# Patient Record
Sex: MALE | Race: OTHER
[De-identification: names, ages, dates, MRNs, and addresses within clinical notes are randomized per-mention and may not be internally consistent; named-entity substitution may affect disease eponyms.]

---

## 2018-09-03 ENCOUNTER — HOSPITAL ENCOUNTER (EMERGENCY)
Dept: HOSPITAL 54 - ER | Age: 38
Discharge: HOME | End: 2018-09-03
Payer: SELF-PAY

## 2018-09-03 VITALS — SYSTOLIC BLOOD PRESSURE: 145 MMHG | DIASTOLIC BLOOD PRESSURE: 96 MMHG

## 2018-09-03 VITALS — WEIGHT: 200 LBS | BODY MASS INDEX: 30.31 KG/M2 | HEIGHT: 68 IN

## 2018-09-03 DIAGNOSIS — R00.2: Primary | ICD-10-CM

## 2018-09-03 DIAGNOSIS — R07.89: ICD-10-CM

## 2018-09-03 DIAGNOSIS — F41.9: ICD-10-CM

## 2018-09-03 LAB
APTT PPP: 24 SEC (ref 23–34)
BASOPHILS # BLD AUTO: 0.2 /CMM (ref 0–0.2)
BASOPHILS NFR BLD AUTO: 2.8 % (ref 0–2)
BUN SERPL-MCNC: 13 MG/DL (ref 7–18)
CALCIUM SERPL-MCNC: 8.8 MG/DL (ref 8.5–10.1)
CHLORIDE SERPL-SCNC: 102 MMOL/L (ref 98–107)
CO2 SERPL-SCNC: 28 MMOL/L (ref 21–32)
CREAT SERPL-MCNC: 1.2 MG/DL (ref 0.6–1.3)
EOSINOPHIL NFR BLD AUTO: 0.3 % (ref 0–6)
GLUCOSE SERPL-MCNC: 101 MG/DL (ref 74–106)
HCT VFR BLD AUTO: 47 % (ref 39–51)
HGB BLD-MCNC: 15.2 G/DL (ref 13.5–17.5)
INR PPP: 0.95 (ref 0.85–1.15)
LYMPHOCYTES NFR BLD AUTO: 1.4 /CMM (ref 0.8–4.8)
LYMPHOCYTES NFR BLD AUTO: 22.8 % (ref 20–44)
MCH RBC QN AUTO: 26 PG (ref 26–33)
MCHC RBC AUTO-ENTMCNC: 32 G/DL (ref 31–36)
MCV RBC AUTO: 82 FL (ref 80–96)
MONOCYTES NFR BLD AUTO: 0.4 /CMM (ref 0.1–1.3)
MONOCYTES NFR BLD AUTO: 6.3 % (ref 2–12)
NEUTROPHILS # BLD AUTO: 4.1 /CMM (ref 1.8–8.9)
NEUTROPHILS NFR BLD AUTO: 67.8 % (ref 43–81)
PLATELET # BLD AUTO: 209 /CMM (ref 150–450)
POTASSIUM SERPL-SCNC: 4.1 MMOL/L (ref 3.5–5.1)
RBC # BLD AUTO: 5.77 MIL/UL (ref 4.5–6)
RDW COEFFICIENT OF VARIATION: 14 (ref 11.5–15)
SODIUM SERPL-SCNC: 138 MMOL/L (ref 136–145)
TROPONIN I SERPL-MCNC: < 0.017 NG/ML (ref 0–0.06)
WBC NRBC COR # BLD AUTO: 6.1 K/UL (ref 4.3–11)

## 2018-09-03 PROCEDURE — Z7610: HCPCS

## 2018-09-03 PROCEDURE — 80048 BASIC METABOLIC PNL TOTAL CA: CPT

## 2018-09-03 PROCEDURE — 85025 COMPLETE CBC W/AUTO DIFF WBC: CPT

## 2018-09-03 PROCEDURE — 84484 ASSAY OF TROPONIN QUANT: CPT

## 2018-09-03 PROCEDURE — A4606 OXYGEN PROBE USED W OXIMETER: HCPCS

## 2018-09-03 PROCEDURE — 85730 THROMBOPLASTIN TIME PARTIAL: CPT

## 2018-09-03 PROCEDURE — 36415 COLL VENOUS BLD VENIPUNCTURE: CPT

## 2018-09-03 PROCEDURE — 71045 X-RAY EXAM CHEST 1 VIEW: CPT

## 2018-09-03 PROCEDURE — 99285 EMERGENCY DEPT VISIT HI MDM: CPT

## 2018-09-03 PROCEDURE — 93005 ELECTROCARDIOGRAM TRACING: CPT

## 2018-09-03 NOTE — NUR
PT BIBRA FROM WORK TO ER BED 12 C/O HEART PALPITATION, BLURRING OF VISION AND 
GENERALIZED BODY ACHES X "COUPLE OF DAYS" NO NEURO DEFICIT NOTED AT THIS TIME. 
PT IS AAO, PLACED ON MONITOR. AWAIITING MD HERNÁNDEZ.

## 2023-03-21 ENCOUNTER — HOSPITAL ENCOUNTER (EMERGENCY)
Dept: HOSPITAL 87 - ER | Age: 43
Discharge: HOME | End: 2023-03-21
Payer: MEDICAID

## 2023-03-21 VITALS — WEIGHT: 194.01 LBS | BODY MASS INDEX: 29.4 KG/M2 | HEIGHT: 68 IN

## 2023-03-21 VITALS — DIASTOLIC BLOOD PRESSURE: 101 MMHG | SYSTOLIC BLOOD PRESSURE: 147 MMHG

## 2023-03-21 DIAGNOSIS — J01.90: Primary | ICD-10-CM

## 2023-03-21 PROCEDURE — 99283 EMERGENCY DEPT VISIT LOW MDM: CPT
